# Patient Record
Sex: MALE | Race: WHITE | Employment: OTHER | URBAN - NONMETROPOLITAN AREA
[De-identification: names, ages, dates, MRNs, and addresses within clinical notes are randomized per-mention and may not be internally consistent; named-entity substitution may affect disease eponyms.]

---

## 2018-09-21 ENCOUNTER — HOSPITAL ENCOUNTER (INPATIENT)
Age: 28
LOS: 1 days | Discharge: HOME | DRG: 710 | End: 2018-09-23
Attending: EMERGENCY MEDICINE | Admitting: ORTHOPAEDIC SURGERY
Payer: MEDICAID

## 2018-09-21 ENCOUNTER — APPOINTMENT (OUTPATIENT)
Dept: GENERAL RADIOLOGY | Age: 28
DRG: 710 | End: 2018-09-21
Payer: MEDICAID

## 2018-09-21 DIAGNOSIS — M00.9 PYOGENIC ARTHRITIS OF RIGHT KNEE JOINT, DUE TO UNSPECIFIED ORGANISM (HCC): Primary | ICD-10-CM

## 2018-09-21 LAB
ANION GAP SERPL CALCULATED.3IONS-SCNC: 15 MEQ/L (ref 8–16)
BASOPHILS # BLD: 0.2 %
BASOPHILS ABSOLUTE: 0 THOU/MM3 (ref 0–0.1)
BUN BLDV-MCNC: 7 MG/DL (ref 7–22)
C-REACTIVE PROTEIN: 11.39 MG/DL (ref 0–1)
CALCIUM SERPL-MCNC: 10 MG/DL (ref 8.5–10.5)
CHLORIDE BLD-SCNC: 98 MEQ/L (ref 98–111)
CO2: 25 MEQ/L (ref 23–33)
CREAT SERPL-MCNC: 1.2 MG/DL (ref 0.4–1.2)
EOSINOPHIL # BLD: 0.2 %
EOSINOPHILS ABSOLUTE: 0 THOU/MM3 (ref 0–0.4)
ERYTHROCYTE [DISTWIDTH] IN BLOOD BY AUTOMATED COUNT: 13 % (ref 11.5–14.5)
ERYTHROCYTE [DISTWIDTH] IN BLOOD BY AUTOMATED COUNT: 42 FL (ref 35–45)
GFR SERPL CREATININE-BSD FRML MDRD: 73 ML/MIN/1.73M2
GLUCOSE BLD-MCNC: 232 MG/DL (ref 70–108)
HCT VFR BLD CALC: 44.6 % (ref 42–52)
HEMOGLOBIN: 15.6 GM/DL (ref 14–18)
IMMATURE GRANS (ABS): 0.06 THOU/MM3 (ref 0–0.07)
IMMATURE GRANULOCYTES: 0.4 %
LYMPHOCYTES # BLD: 9.4 %
LYMPHOCYTES ABSOLUTE: 1.4 THOU/MM3 (ref 1–4.8)
MCH RBC QN AUTO: 31.1 PG (ref 26–33)
MCHC RBC AUTO-ENTMCNC: 35 GM/DL (ref 32.2–35.5)
MCV RBC AUTO: 88.8 FL (ref 80–94)
MONOCYTES # BLD: 6.3 %
MONOCYTES ABSOLUTE: 1 THOU/MM3 (ref 0.4–1.3)
NUCLEATED RED BLOOD CELLS: 0 /100 WBC
OSMOLALITY CALCULATION: 281.1 MOSMOL/KG (ref 275–300)
PLATELET # BLD: 266 THOU/MM3 (ref 130–400)
PMV BLD AUTO: 10.2 FL (ref 9.4–12.4)
POTASSIUM SERPL-SCNC: 3.7 MEQ/L (ref 3.5–5.2)
RBC # BLD: 5.02 MILL/MM3 (ref 4.7–6.1)
SEG NEUTROPHILS: 83.5 %
SEGMENTED NEUTROPHILS ABSOLUTE COUNT: 12.9 THOU/MM3 (ref 1.8–7.7)
SODIUM BLD-SCNC: 138 MEQ/L (ref 135–145)
WBC # BLD: 15.4 THOU/MM3 (ref 4.8–10.8)

## 2018-09-21 PROCEDURE — 86140 C-REACTIVE PROTEIN: CPT

## 2018-09-21 PROCEDURE — 84550 ASSAY OF BLOOD/URIC ACID: CPT

## 2018-09-21 PROCEDURE — 20610 DRAIN/INJ JOINT/BURSA W/O US: CPT

## 2018-09-21 PROCEDURE — 80048 BASIC METABOLIC PNL TOTAL CA: CPT

## 2018-09-21 PROCEDURE — 85025 COMPLETE CBC W/AUTO DIFF WBC: CPT

## 2018-09-21 PROCEDURE — 36415 COLL VENOUS BLD VENIPUNCTURE: CPT

## 2018-09-21 PROCEDURE — 6370000000 HC RX 637 (ALT 250 FOR IP): Performed by: STUDENT IN AN ORGANIZED HEALTH CARE EDUCATION/TRAINING PROGRAM

## 2018-09-21 PROCEDURE — 99285 EMERGENCY DEPT VISIT HI MDM: CPT

## 2018-09-21 PROCEDURE — 73564 X-RAY EXAM KNEE 4 OR MORE: CPT

## 2018-09-21 RX ORDER — KETOROLAC TROMETHAMINE 30 MG/ML
30 INJECTION, SOLUTION INTRAMUSCULAR; INTRAVENOUS ONCE
Status: DISCONTINUED | OUTPATIENT
Start: 2018-09-21 | End: 2018-09-21

## 2018-09-21 RX ORDER — TRAMADOL HYDROCHLORIDE 50 MG/1
50 TABLET ORAL ONCE
Status: COMPLETED | OUTPATIENT
Start: 2018-09-21 | End: 2018-09-21

## 2018-09-21 RX ADMIN — TRAMADOL HYDROCHLORIDE 50 MG: 50 TABLET, FILM COATED ORAL at 23:36

## 2018-09-21 ASSESSMENT — PAIN DESCRIPTION - LOCATION: LOCATION: KNEE

## 2018-09-21 ASSESSMENT — PAIN DESCRIPTION - PAIN TYPE: TYPE: ACUTE PAIN

## 2018-09-21 ASSESSMENT — ENCOUNTER SYMPTOMS
BACK PAIN: 0
COLOR CHANGE: 1
VOMITING: 0
NAUSEA: 0
SHORTNESS OF BREATH: 0
COUGH: 0

## 2018-09-21 ASSESSMENT — PAIN SCALES - GENERAL
PAINLEVEL_OUTOF10: 10
PAINLEVEL_OUTOF10: 10

## 2018-09-21 ASSESSMENT — PAIN DESCRIPTION - ORIENTATION: ORIENTATION: RIGHT

## 2018-09-22 ENCOUNTER — ANESTHESIA (OUTPATIENT)
Dept: OPERATING ROOM | Age: 28
DRG: 710 | End: 2018-09-22
Payer: MEDICAID

## 2018-09-22 ENCOUNTER — ANESTHESIA EVENT (OUTPATIENT)
Dept: OPERATING ROOM | Age: 28
DRG: 710 | End: 2018-09-22
Payer: MEDICAID

## 2018-09-22 ENCOUNTER — APPOINTMENT (OUTPATIENT)
Dept: GENERAL RADIOLOGY | Age: 28
DRG: 710 | End: 2018-09-22
Payer: MEDICAID

## 2018-09-22 VITALS
DIASTOLIC BLOOD PRESSURE: 49 MMHG | RESPIRATION RATE: 6 BRPM | SYSTOLIC BLOOD PRESSURE: 67 MMHG | TEMPERATURE: 98.6 F | OXYGEN SATURATION: 100 %

## 2018-09-22 PROBLEM — R73.9 HYPERGLYCEMIA: Status: ACTIVE | Noted: 2018-09-22

## 2018-09-22 PROBLEM — M00.9 SEPTIC ARTHRITIS OF KNEE (HCC): Status: ACTIVE | Noted: 2018-09-22

## 2018-09-22 PROBLEM — E79.0 HYPERURICEMIA: Status: ACTIVE | Noted: 2018-09-22

## 2018-09-22 PROBLEM — N39.0 UTI (URINARY TRACT INFECTION): Status: ACTIVE | Noted: 2018-09-22

## 2018-09-22 LAB
AMPHETAMINE+METHAMPHETAMINE URINE SCREEN: NEGATIVE
APTT: 37 SECONDS (ref 22–38)
AVERAGE GLUCOSE: 120 MG/DL (ref 70–126)
BACTERIA: ABNORMAL /HPF
BARBITURATE QUANTITATIVE URINE: NEGATIVE
BENZODIAZEPINE QUANTITATIVE URINE: NEGATIVE
BILIRUBIN URINE: NEGATIVE
BLOOD, URINE: ABNORMAL
CANNABINOID QUANTITATIVE URINE: NEGATIVE
CASTS 2: ABNORMAL /LPF
CASTS UA: ABNORMAL /LPF
CHARACTER, URINE: CLEAR
CHARACTER,SYN.FL: ABNORMAL
CHARACTER,SYN.FL: ABNORMAL
COCAINE METABOLITE QUANTITATIVE URINE: NEGATIVE
COLOR FLUID: ABNORMAL
COLOR FLUID: ABNORMAL
COLOR: YELLOW
CRYSTALS, FLUID: ABNORMAL
CRYSTALS, FLUID: ABNORMAL
CRYSTALS, UA: ABNORMAL
EPITHELIAL CELLS, UA: ABNORMAL /HPF
GLUCOSE BLD-MCNC: 110 MG/DL (ref 70–108)
GLUCOSE BLD-MCNC: 126 MG/DL (ref 70–108)
GLUCOSE BLD-MCNC: 145 MG/DL (ref 70–108)
GLUCOSE BLD-MCNC: 162 MG/DL (ref 70–108)
GLUCOSE URINE: 250 MG/DL
HBA1C MFR BLD: 6 % (ref 4.4–6.4)
INR BLD: 1.12 (ref 0.85–1.13)
KETONES, URINE: NEGATIVE
LACTIC ACID: 1.8 MMOL/L (ref 0.5–2.2)
LEUKOCYTE ESTERASE, URINE: NEGATIVE
MISCELLANEOUS 2: ABNORMAL
MONONUCLEAR CELLS SYNOVIAL FLUID: 5.3 % (ref 0–74)
MONONUCLEAR CELLS SYNOVIAL FLUID: 7.6 % (ref 0–74)
NITRITE, URINE: POSITIVE
OPIATES, URINE: NEGATIVE
OXYCODONE: NEGATIVE
PATHOLOGIST REVIEW: ABNORMAL
PATHOLOGIST REVIEW: ABNORMAL
PH UA: 6.5
PHENCYCLIDINE QUANTITATIVE URINE: NEGATIVE
POLYMORPHONUCLEAR CELLS SYNOVIAL FLUID: 92.4 % (ref 0–24)
POLYMORPHONUCLEAR CELLS SYNOVIAL FLUID: 94.7 % (ref 0–24)
PROCALCITONIN: 0.14 NG/ML (ref 0.01–0.09)
PROTEIN UA: 30
RBC URINE: ABNORMAL /HPF
RENAL EPITHELIAL, UA: ABNORMAL
SEDIMENTATION RATE, ERYTHROCYTE: 53 MM/HR (ref 0–10)
SPECIFIC GRAVITY, URINE: 1.01 (ref 1–1.03)
TOTAL NUCLEATED CELLS SYNOVIAL: ABNORMAL /CUMM (ref 0–150)
TOTAL NUCLEATED CELLS SYNOVIAL: ABNORMAL /CUMM (ref 0–150)
TOTAL VOLUME RECEIVED SYNOVIAL: 25 ML
TOTAL VOLUME RECEIVED SYNOVIAL: 7 ML
TROPONIN T: < 0.01 NG/ML
URIC ACID: 11.1 MG/DL (ref 3.7–7)
UROBILINOGEN, URINE: 1 EU/DL
WBC UA: ABNORMAL /HPF
YEAST: ABNORMAL

## 2018-09-22 PROCEDURE — 2709999900 HC NON-CHARGEABLE SUPPLY

## 2018-09-22 PROCEDURE — 2709999900 HC NON-CHARGEABLE SUPPLY: Performed by: ORTHOPAEDIC SURGERY

## 2018-09-22 PROCEDURE — 85610 PROTHROMBIN TIME: CPT

## 2018-09-22 PROCEDURE — 3700000001 HC ADD 15 MINUTES (ANESTHESIA): Performed by: ORTHOPAEDIC SURGERY

## 2018-09-22 PROCEDURE — 36430 TRANSFUSION BLD/BLD COMPNT: CPT

## 2018-09-22 PROCEDURE — 89050 BODY FLUID CELL COUNT: CPT

## 2018-09-22 PROCEDURE — 2580000003 HC RX 258: Performed by: EMERGENCY MEDICINE

## 2018-09-22 PROCEDURE — 7100000000 HC PACU RECOVERY - FIRST 15 MIN: Performed by: ORTHOPAEDIC SURGERY

## 2018-09-22 PROCEDURE — 36415 COLL VENOUS BLD VENIPUNCTURE: CPT

## 2018-09-22 PROCEDURE — 3600000014 HC SURGERY LEVEL 4 ADDTL 15MIN: Performed by: ORTHOPAEDIC SURGERY

## 2018-09-22 PROCEDURE — 87070 CULTURE OTHR SPECIMN AEROBIC: CPT

## 2018-09-22 PROCEDURE — 83036 HEMOGLOBIN GLYCOSYLATED A1C: CPT

## 2018-09-22 PROCEDURE — 2580000003 HC RX 258: Performed by: ORTHOPAEDIC SURGERY

## 2018-09-22 PROCEDURE — 96365 THER/PROPH/DIAG IV INF INIT: CPT

## 2018-09-22 PROCEDURE — 87075 CULTR BACTERIA EXCEPT BLOOD: CPT

## 2018-09-22 PROCEDURE — 87086 URINE CULTURE/COLONY COUNT: CPT

## 2018-09-22 PROCEDURE — 85730 THROMBOPLASTIN TIME PARTIAL: CPT

## 2018-09-22 PROCEDURE — 82948 REAGENT STRIP/BLOOD GLUCOSE: CPT

## 2018-09-22 PROCEDURE — 6360000002 HC RX W HCPCS: Performed by: INTERNAL MEDICINE

## 2018-09-22 PROCEDURE — 87040 BLOOD CULTURE FOR BACTERIA: CPT

## 2018-09-22 PROCEDURE — 6360000002 HC RX W HCPCS: Performed by: EMERGENCY MEDICINE

## 2018-09-22 PROCEDURE — 84484 ASSAY OF TROPONIN QUANT: CPT

## 2018-09-22 PROCEDURE — 94760 N-INVAS EAR/PLS OXIMETRY 1: CPT

## 2018-09-22 PROCEDURE — 1200000000 HC SEMI PRIVATE

## 2018-09-22 PROCEDURE — 3600000004 HC SURGERY LEVEL 4 BASE: Performed by: ORTHOPAEDIC SURGERY

## 2018-09-22 PROCEDURE — 0SBC4ZZ EXCISION OF RIGHT KNEE JOINT, PERCUTANEOUS ENDOSCOPIC APPROACH: ICD-10-PCS | Performed by: ORTHOPAEDIC SURGERY

## 2018-09-22 PROCEDURE — 6370000000 HC RX 637 (ALT 250 FOR IP): Performed by: EMERGENCY MEDICINE

## 2018-09-22 PROCEDURE — 3700000000 HC ANESTHESIA ATTENDED CARE: Performed by: ORTHOPAEDIC SURGERY

## 2018-09-22 PROCEDURE — 2500000003 HC RX 250 WO HCPCS: Performed by: ORTHOPAEDIC SURGERY

## 2018-09-22 PROCEDURE — 83605 ASSAY OF LACTIC ACID: CPT

## 2018-09-22 PROCEDURE — 7100000001 HC PACU RECOVERY - ADDTL 15 MIN: Performed by: ORTHOPAEDIC SURGERY

## 2018-09-22 PROCEDURE — 89060 EXAM SYNOVIAL FLUID CRYSTALS: CPT

## 2018-09-22 PROCEDURE — 87186 SC STD MICRODIL/AGAR DIL: CPT

## 2018-09-22 PROCEDURE — 81001 URINALYSIS AUTO W/SCOPE: CPT

## 2018-09-22 PROCEDURE — 2580000003 HC RX 258: Performed by: ANESTHESIOLOGY

## 2018-09-22 PROCEDURE — 87205 SMEAR GRAM STAIN: CPT

## 2018-09-22 PROCEDURE — 99252 IP/OBS CONSLTJ NEW/EST SF 35: CPT | Performed by: FAMILY MEDICINE

## 2018-09-22 PROCEDURE — 6370000000 HC RX 637 (ALT 250 FOR IP): Performed by: FAMILY MEDICINE

## 2018-09-22 PROCEDURE — 6360000002 HC RX W HCPCS: Performed by: ANESTHESIOLOGY

## 2018-09-22 PROCEDURE — 71045 X-RAY EXAM CHEST 1 VIEW: CPT

## 2018-09-22 PROCEDURE — 84145 PROCALCITONIN (PCT): CPT

## 2018-09-22 PROCEDURE — 85651 RBC SED RATE NONAUTOMATED: CPT

## 2018-09-22 PROCEDURE — 80307 DRUG TEST PRSMV CHEM ANLYZR: CPT

## 2018-09-22 PROCEDURE — 87077 CULTURE AEROBIC IDENTIFY: CPT

## 2018-09-22 RX ORDER — MORPHINE SULFATE 2 MG/ML
2 INJECTION, SOLUTION INTRAMUSCULAR; INTRAVENOUS
Status: CANCELLED | OUTPATIENT
Start: 2018-09-22

## 2018-09-22 RX ORDER — ACETAMINOPHEN 325 MG/1
650 TABLET ORAL EVERY 4 HOURS PRN
Status: CANCELLED | OUTPATIENT
Start: 2018-09-22

## 2018-09-22 RX ORDER — HYDROCODONE BITARTRATE AND ACETAMINOPHEN 5; 325 MG/1; MG/1
1 TABLET ORAL EVERY 4 HOURS PRN
Status: CANCELLED | OUTPATIENT
Start: 2018-09-22

## 2018-09-22 RX ORDER — BUPIVACAINE HYDROCHLORIDE AND EPINEPHRINE 5; 5 MG/ML; UG/ML
INJECTION, SOLUTION EPIDURAL; INTRACAUDAL; PERINEURAL PRN
Status: DISCONTINUED | OUTPATIENT
Start: 2018-09-22 | End: 2018-09-23 | Stop reason: HOSPADM

## 2018-09-22 RX ORDER — SODIUM CHLORIDE 0.9 % (FLUSH) 0.9 %
10 SYRINGE (ML) INJECTION EVERY 12 HOURS SCHEDULED
Status: CANCELLED | OUTPATIENT
Start: 2018-09-22

## 2018-09-22 RX ORDER — FENTANYL CITRATE 50 UG/ML
50 INJECTION, SOLUTION INTRAMUSCULAR; INTRAVENOUS EVERY 5 MIN PRN
Status: DISCONTINUED | OUTPATIENT
Start: 2018-09-22 | End: 2018-09-22 | Stop reason: HOSPADM

## 2018-09-22 RX ORDER — DOCUSATE SODIUM 100 MG/1
100 CAPSULE, LIQUID FILLED ORAL 2 TIMES DAILY
Status: CANCELLED | OUTPATIENT
Start: 2018-09-22

## 2018-09-22 RX ORDER — MORPHINE SULFATE 4 MG/ML
4 INJECTION, SOLUTION INTRAMUSCULAR; INTRAVENOUS
Status: CANCELLED | OUTPATIENT
Start: 2018-09-22

## 2018-09-22 RX ORDER — NICOTINE POLACRILEX 4 MG
15 LOZENGE BUCCAL PRN
Status: DISCONTINUED | OUTPATIENT
Start: 2018-09-22 | End: 2018-09-23 | Stop reason: HOSPADM

## 2018-09-22 RX ORDER — SODIUM CHLORIDE 0.9 % (FLUSH) 0.9 %
10 SYRINGE (ML) INJECTION PRN
Status: CANCELLED | OUTPATIENT
Start: 2018-09-22

## 2018-09-22 RX ORDER — ONDANSETRON 2 MG/ML
4 INJECTION INTRAMUSCULAR; INTRAVENOUS EVERY 6 HOURS PRN
Status: CANCELLED | OUTPATIENT
Start: 2018-09-22

## 2018-09-22 RX ORDER — SODIUM CHLORIDE 9 MG/ML
INJECTION, SOLUTION INTRAVENOUS CONTINUOUS
Status: CANCELLED | OUTPATIENT
Start: 2018-09-22 | End: 2018-09-22

## 2018-09-22 RX ORDER — KETOROLAC TROMETHAMINE 30 MG/ML
INJECTION, SOLUTION INTRAMUSCULAR; INTRAVENOUS PRN
Status: DISCONTINUED | OUTPATIENT
Start: 2018-09-22 | End: 2018-09-22 | Stop reason: SDUPTHER

## 2018-09-22 RX ORDER — FENTANYL CITRATE 50 UG/ML
INJECTION, SOLUTION INTRAMUSCULAR; INTRAVENOUS PRN
Status: DISCONTINUED | OUTPATIENT
Start: 2018-09-22 | End: 2018-09-22 | Stop reason: SDUPTHER

## 2018-09-22 RX ORDER — ACETAMINOPHEN 325 MG/1
650 TABLET ORAL EVERY 6 HOURS PRN
COMMUNITY

## 2018-09-22 RX ORDER — MIDAZOLAM HYDROCHLORIDE 1 MG/ML
INJECTION INTRAMUSCULAR; INTRAVENOUS PRN
Status: DISCONTINUED | OUTPATIENT
Start: 2018-09-22 | End: 2018-09-22 | Stop reason: SDUPTHER

## 2018-09-22 RX ORDER — SODIUM CHLORIDE 9 MG/ML
INJECTION, SOLUTION INTRAVENOUS CONTINUOUS
Status: DISCONTINUED | OUTPATIENT
Start: 2018-09-22 | End: 2018-09-22

## 2018-09-22 RX ORDER — 0.9 % SODIUM CHLORIDE 0.9 %
1000 INTRAVENOUS SOLUTION INTRAVENOUS ONCE
Status: COMPLETED | OUTPATIENT
Start: 2018-09-22 | End: 2018-09-22

## 2018-09-22 RX ORDER — 0.9 % SODIUM CHLORIDE 0.9 %
30 INTRAVENOUS SOLUTION INTRAVENOUS ONCE
Status: COMPLETED | OUTPATIENT
Start: 2018-09-22 | End: 2018-09-22

## 2018-09-22 RX ORDER — ACETAMINOPHEN 325 MG/1
650 TABLET ORAL ONCE
Status: COMPLETED | OUTPATIENT
Start: 2018-09-22 | End: 2018-09-22

## 2018-09-22 RX ORDER — HYDROCODONE BITARTRATE AND ACETAMINOPHEN 5; 325 MG/1; MG/1
2 TABLET ORAL EVERY 4 HOURS PRN
Status: CANCELLED | OUTPATIENT
Start: 2018-09-22

## 2018-09-22 RX ORDER — PROPOFOL 10 MG/ML
INJECTION, EMULSION INTRAVENOUS PRN
Status: DISCONTINUED | OUTPATIENT
Start: 2018-09-22 | End: 2018-09-22 | Stop reason: SDUPTHER

## 2018-09-22 RX ORDER — DEXTROSE MONOHYDRATE 50 MG/ML
100 INJECTION, SOLUTION INTRAVENOUS PRN
Status: DISCONTINUED | OUTPATIENT
Start: 2018-09-22 | End: 2018-09-23 | Stop reason: HOSPADM

## 2018-09-22 RX ORDER — ACETAMINOPHEN 325 MG/1
650 TABLET ORAL EVERY 4 HOURS PRN
Status: DISCONTINUED | OUTPATIENT
Start: 2018-09-22 | End: 2018-09-23 | Stop reason: HOSPADM

## 2018-09-22 RX ORDER — SODIUM CHLORIDE 9 MG/ML
INJECTION, SOLUTION INTRAVENOUS CONTINUOUS PRN
Status: DISCONTINUED | OUTPATIENT
Start: 2018-09-22 | End: 2018-09-22 | Stop reason: SDUPTHER

## 2018-09-22 RX ORDER — DEXTROSE MONOHYDRATE 25 G/50ML
12.5 INJECTION, SOLUTION INTRAVENOUS PRN
Status: DISCONTINUED | OUTPATIENT
Start: 2018-09-22 | End: 2018-09-23 | Stop reason: HOSPADM

## 2018-09-22 RX ORDER — SUCCINYLCHOLINE CHLORIDE 20 MG/ML
INJECTION INTRAMUSCULAR; INTRAVENOUS PRN
Status: DISCONTINUED | OUTPATIENT
Start: 2018-09-22 | End: 2018-09-22 | Stop reason: SDUPTHER

## 2018-09-22 RX ORDER — METHYLPREDNISOLONE 4 MG/1
4 TABLET ORAL DAILY
Status: DISCONTINUED | OUTPATIENT
Start: 2018-09-22 | End: 2018-09-23 | Stop reason: HOSPADM

## 2018-09-22 RX ORDER — METHYLPREDNISOLONE 4 MG/1
TABLET ORAL
Qty: 1 KIT | Refills: 0 | Status: SHIPPED | OUTPATIENT
Start: 2018-09-22 | End: 2018-09-28

## 2018-09-22 RX ORDER — MORPHINE SULFATE 2 MG/ML
2 INJECTION, SOLUTION INTRAMUSCULAR; INTRAVENOUS EVERY 5 MIN PRN
Status: DISCONTINUED | OUTPATIENT
Start: 2018-09-22 | End: 2018-09-22 | Stop reason: HOSPADM

## 2018-09-22 RX ORDER — LABETALOL HYDROCHLORIDE 5 MG/ML
10 INJECTION, SOLUTION INTRAVENOUS EVERY 10 MIN PRN
Status: DISCONTINUED | OUTPATIENT
Start: 2018-09-22 | End: 2018-09-22 | Stop reason: HOSPADM

## 2018-09-22 RX ADMIN — VANCOMYCIN HYDROCHLORIDE 1000 MG: 1 INJECTION, POWDER, LYOPHILIZED, FOR SOLUTION INTRAVENOUS at 07:06

## 2018-09-22 RX ADMIN — MIDAZOLAM HYDROCHLORIDE 2 MG: 1 INJECTION, SOLUTION INTRAMUSCULAR; INTRAVENOUS at 06:33

## 2018-09-22 RX ADMIN — PROPOFOL 40 MG: 10 INJECTION, EMULSION INTRAVENOUS at 06:38

## 2018-09-22 RX ADMIN — ACETAMINOPHEN 650 MG: 325 TABLET ORAL at 01:41

## 2018-09-22 RX ADMIN — SODIUM CHLORIDE: 9 INJECTION, SOLUTION INTRAVENOUS at 06:17

## 2018-09-22 RX ADMIN — INSULIN LISPRO 1 UNITS: 100 INJECTION, SOLUTION INTRAVENOUS; SUBCUTANEOUS at 21:28

## 2018-09-22 RX ADMIN — PROPOFOL 100 MG: 10 INJECTION, EMULSION INTRAVENOUS at 07:00

## 2018-09-22 RX ADMIN — METHYLPREDNISOLONE 4 MG: 4 TABLET ORAL at 13:13

## 2018-09-22 RX ADMIN — SODIUM CHLORIDE: 9 INJECTION, SOLUTION INTRAVENOUS at 04:27

## 2018-09-22 RX ADMIN — FENTANYL CITRATE 100 MCG: 50 INJECTION INTRAMUSCULAR; INTRAVENOUS at 06:33

## 2018-09-22 RX ADMIN — VANCOMYCIN HYDROCHLORIDE 1000 MG: 1 INJECTION, POWDER, LYOPHILIZED, FOR SOLUTION INTRAVENOUS at 03:16

## 2018-09-22 RX ADMIN — CEFTRIAXONE SODIUM 1 G: 1 INJECTION, POWDER, FOR SOLUTION INTRAMUSCULAR; INTRAVENOUS at 02:51

## 2018-09-22 RX ADMIN — PROPOFOL 100 MG: 10 INJECTION, EMULSION INTRAVENOUS at 06:55

## 2018-09-22 RX ADMIN — PROPOFOL 160 MG: 10 INJECTION, EMULSION INTRAVENOUS at 06:33

## 2018-09-22 RX ADMIN — SODIUM CHLORIDE 1000 ML: 9 INJECTION, SOLUTION INTRAVENOUS at 03:19

## 2018-09-22 RX ADMIN — SUCCINYLCHOLINE CHLORIDE 120 MG: 20 INJECTION, SOLUTION INTRAMUSCULAR; INTRAVENOUS at 06:34

## 2018-09-22 RX ADMIN — KETOROLAC TROMETHAMINE 30 MG: 30 INJECTION, SOLUTION INTRAMUSCULAR; INTRAVENOUS at 07:09

## 2018-09-22 RX ADMIN — SODIUM CHLORIDE 2421 ML: 9 INJECTION, SOLUTION INTRAVENOUS at 00:50

## 2018-09-22 ASSESSMENT — PULMONARY FUNCTION TESTS
PIF_VALUE: 21
PIF_VALUE: 5
PIF_VALUE: 21
PIF_VALUE: 24
PIF_VALUE: 20
PIF_VALUE: 23
PIF_VALUE: 7
PIF_VALUE: 23
PIF_VALUE: 5
PIF_VALUE: 25
PIF_VALUE: 24
PIF_VALUE: 27
PIF_VALUE: 10
PIF_VALUE: 1
PIF_VALUE: 9
PIF_VALUE: 21
PIF_VALUE: 24
PIF_VALUE: 5
PIF_VALUE: 4
PIF_VALUE: 23
PIF_VALUE: 7
PIF_VALUE: 4
PIF_VALUE: 21
PIF_VALUE: 9
PIF_VALUE: 24
PIF_VALUE: 15
PIF_VALUE: 2
PIF_VALUE: 3
PIF_VALUE: 23
PIF_VALUE: 19
PIF_VALUE: 1
PIF_VALUE: 20
PIF_VALUE: 20
PIF_VALUE: 23
PIF_VALUE: 4
PIF_VALUE: 22
PIF_VALUE: 1
PIF_VALUE: 22
PIF_VALUE: 6
PIF_VALUE: 21
PIF_VALUE: 4
PIF_VALUE: 23
PIF_VALUE: 24
PIF_VALUE: 21
PIF_VALUE: 6
PIF_VALUE: 21
PIF_VALUE: 24
PIF_VALUE: 21
PIF_VALUE: 1
PIF_VALUE: 1
PIF_VALUE: 4
PIF_VALUE: 7
PIF_VALUE: 22
PIF_VALUE: 24
PIF_VALUE: 23
PIF_VALUE: 24
PIF_VALUE: 25
PIF_VALUE: 20
PIF_VALUE: 21
PIF_VALUE: 4

## 2018-09-22 ASSESSMENT — PAIN SCALES - GENERAL
PAINLEVEL_OUTOF10: 6
PAINLEVEL_OUTOF10: 0
PAINLEVEL_OUTOF10: 5
PAINLEVEL_OUTOF10: 6
PAINLEVEL_OUTOF10: 6
PAINLEVEL_OUTOF10: 3
PAINLEVEL_OUTOF10: 6
PAINLEVEL_OUTOF10: 0
PAINLEVEL_OUTOF10: 0

## 2018-09-22 ASSESSMENT — ENCOUNTER SYMPTOMS
RHINORRHEA: 0
BACK PAIN: 0
SHORTNESS OF BREATH: 0
EYE DISCHARGE: 0
SORE THROAT: 0
DIARRHEA: 0
EYE REDNESS: 0
NAUSEA: 0
WHEEZING: 0
COUGH: 0
ABDOMINAL PAIN: 0
VOMITING: 0

## 2018-09-22 ASSESSMENT — PAIN DESCRIPTION - PROGRESSION
CLINICAL_PROGRESSION: NOT CHANGED

## 2018-09-22 NOTE — H&P
Ortho H&P/Consult  Patient:  Colt Stephenson  YOB: 1990  MRN: [id number]     Acct: [de-identified]    PCP: Anna Mccoy MD  Date of Admission: 9/21/2018  Date of Service: Pt seen/examined on 9/22/2018     Chief Complaint: right knee pain since 9/19/18   History Of Present Illness: 33 y.o. male who presents with right knee pain since 9/19/18. No known injury, fall or scratches on knee. Patient had gradually increasing knee pain that progressed to point he couldn't walk so was brought to ED. Was treated with ice and Tylenol at home. Patient lives with his mother and is able to walk as far as he wants without pain. Does have history of gouty arthritis. Patient ambulation status: no difficulty prior to injury. Antiplatelets/Anticoagulation includes: none. Hx from chart and/or Pt's mother    Past Medical History:        Diagnosis Date    Blindness, legal        Past Surgical History:        Procedure Laterality Date    TESTICLE SURGERY Right 1994, 1996, and 2000    Dr. Mihai Naik and Dr. Sophia Damico Medications:   Prior to Admission medications    Medication Sig Start Date End Date Taking?  Authorizing Provider   acetaminophen (TYLENOL) 325 MG tablet Take 650 mg by mouth every 6 hours as needed for Pain   Yes Historical Provider, MD   aspirin 81 MG tablet Take 81 mg by mouth as needed for Pain   Yes Historical Provider, MD       Current Hospital Medications:    Current Facility-Administered Medications:     glucose (GLUTOSE) 40 % oral gel 15 g, 15 g, Oral, PRN, Ana Laura Udeagbala, DO    dextrose 50 % solution 12.5 g, 12.5 g, Intravenous, PRN, Ana Laura Udeagbala, DO    glucagon (rDNA) injection 1 mg, 1 mg, Intramuscular, PRN, Ana Laura Udeagbala, DO    dextrose 5 % solution, 100 mL/hr, Intravenous, PRN, Ana Laura Udeagbala, DO    0.9 % sodium chloride infusion, , Intravenous, Continuous, Vanita Quijano MD, Last Rate: 125 mL/hr at

## 2018-09-22 NOTE — ED NOTES
Pt resting on cot watching television with family at bedside. Respirations easy and unlabored.       Markos Salazar RN  09/22/18 1224

## 2018-09-22 NOTE — ED NOTES
Dr. Issa Keen at bedside for synovial fluid removal at this time. Pt tolerating well. Fluid sent to lab.      Juanpablo Arroyo RN  09/22/18 2547

## 2018-09-22 NOTE — ED NOTES
Pt resting on cot with eyes closed for comfort. Pt no longer flushed at this time, ice packs remain in place.       Alicia Evans RN  09/22/18 4300

## 2018-09-22 NOTE — ANESTHESIA PRE PROCEDURE
Department of Anesthesiology  Preprocedure Note       Name:  Leno Redd   Age:  33 y.o.  :  1990                                          MRN:  [id number]         Date:  2018      Surgeon: Sasha Baires):  Adrian Denny MD    Procedure: Procedure(s):  ARTHROSCOPIC KNEE I&D    Medications prior to admission:   Prior to Admission medications    Medication Sig Start Date End Date Taking? Authorizing Provider   acetaminophen (TYLENOL) 325 MG tablet Take 650 mg by mouth every 6 hours as needed for Pain   Yes Historical Provider, MD   aspirin 81 MG tablet Take 81 mg by mouth as needed for Pain   Yes Historical Provider, MD       Current medications:    Current Facility-Administered Medications   Medication Dose Route Frequency Provider Last Rate Last Dose    glucose (GLUTOSE) 40 % oral gel 15 g  15 g Oral PRN Ana Laura Udeagbala, DO        dextrose 50 % solution 12.5 g  12.5 g Intravenous PRN Ana Laura Udeagbala, DO        glucagon (rDNA) injection 1 mg  1 mg Intramuscular PRN Ana Laura Udeagbala, DO        dextrose 5 % solution  100 mL/hr Intravenous PRN Ana Laura Udeagbala, DO        0.9 % sodium chloride infusion   Intravenous Continuous Kaitlynn Spray,  mL/hr at 18 0427      insulin lispro (HUMALOG) injection vial 0-6 Units  0-6 Units Subcutaneous TID WC Ana Laura Udeagbala, DO        insulin lispro (HUMALOG) injection vial 0-3 Units  0-3 Units Subcutaneous Nightly Ana Laura Udeagbala, DO         Current Outpatient Prescriptions   Medication Sig Dispense Refill    acetaminophen (TYLENOL) 325 MG tablet Take 650 mg by mouth every 6 hours as needed for Pain      aspirin 81 MG tablet Take 81 mg by mouth as needed for Pain         Allergies:     Allergies   Allergen Reactions    Ibuprofen      Seizure        Problem List:    Patient Active Problem List   Diagnosis Code    Legally blind H54.8    Septic arthritis of knee ([facility] [address] M00.9    Hyperglycemia R73.9    UTI (urinary tract infection) N39.0  Hyperuricemia E79.0       Past Medical History:        Diagnosis Date    Blindness, legal        Past Surgical History:        Procedure Laterality Date    TESTICLE SURGERY Right 1994, 1996, and 2000    Dr. Roz De Guzman and Dr. Victoria Valles       Social History:    Social History   Substance Use Topics    Smoking status: Passive Smoke Exposure - Never Smoker    Smokeless tobacco: Never Used    Alcohol use No                                Counseling given: Not Answered      Vital Signs (Current):   Vitals:    09/22/18 0232 09/22/18 0319 09/22/18 0421 09/22/18 0526   BP: 137/79 (!) 155/88 (!) 126/97 (!) 128/94   Pulse: 131 125 128 121   Resp: 16 16 16 16   Temp: 103 °F (39.4 °C)  102.7 °F (39.3 °C) 102.4 °F (39.1 °C)   TempSrc: Oral  Oral Oral   SpO2: 99% 98% 99% 97%   Weight:       Height:                                                  BP Readings from Last 3 Encounters:   09/22/18 (!) 128/94   11/08/16 128/65   11/10/15 126/90       NPO Status:                                                                                 BMI:   Wt Readings from Last 3 Encounters:   09/21/18 178 lb (80.7 kg)   11/08/16 178 lb (80.7 kg)   11/10/15 170 lb 8 oz (77.3 kg)     Body mass index is 31.53 kg/m². CBC:   Lab Results   Component Value Date    WBC 15.4 09/21/2018    RBC 5.02 09/21/2018    HGB 15.6 09/21/2018    HCT 44.6 09/21/2018    MCV 88.8 09/21/2018     09/21/2018       CMP:   Lab Results   Component Value Date     09/21/2018    K 3.7 09/21/2018    CL 98 09/21/2018    CO2 25 09/21/2018    BUN 7 09/21/2018    CREATININE 1.2 09/21/2018    LABGLOM 73 09/21/2018    GLUCOSE 232 09/21/2018    CALCIUM 10.0 09/21/2018       POC Tests: No results for input(s): POCGLU, POCNA, POCK, POCCL, POCBUN, POCHEMO, POCHCT in the last 72 hours.     Coags:   Lab Results   Component Value Date    INR 1.12 09/22/2018    APTT 37.0 09/22/2018       HCG (If Applicable): No results found for:

## 2018-09-22 NOTE — ED PROVIDER NOTES
[facility]  eMERGENCY dEPARTMENT eNCOUnter          CHIEF COMPLAINT       Chief Complaint   Patient presents with    Knee Pain       Nurses Notes reviewed and I agree except as noted in the HPI. HISTORY OF PRESENT ILLNESS    Parvez Das is a 33 y.o. male who presents to Emergency Department with right knee pain since Wednesday night. Patient does not remember any specific injury to his knee. His knee pain is at a 6/10 in severity at rest, but increases to 10/10 when standing. He denies calf pain or right thigh pain. He denies experiencing chest pain, neck pain, sore throat or urinary problems. Patient states he has never had any STDs or other medical problems. He took Tylenol earlier today. Patient denies any further complaints at initial encounter. REVIEW OF SYSTEMS     Review of Systems   Constitutional: Negative for appetite change, chills, fatigue and fever. HENT: Negative for congestion, ear pain, rhinorrhea and sore throat. Eyes: Negative for discharge, redness and visual disturbance. Respiratory: Negative for cough, shortness of breath and wheezing. Cardiovascular: Negative for chest pain, palpitations and leg swelling. Gastrointestinal: Negative for abdominal pain, diarrhea, nausea and vomiting. Genitourinary: Negative for decreased urine volume, difficulty urinating, dysuria and hematuria. Musculoskeletal: Positive for arthralgias (right knee ). Negative for back pain, joint swelling and neck pain. Skin: Negative for pallor and rash. Allergic/Immunologic: Negative for environmental allergies. Neurological: Negative for dizziness, syncope, weakness, light-headedness, numbness and headaches. Hematological: Negative for adenopathy. Psychiatric/Behavioral: Negative for confusion and suicidal ideas. The patient is not nervous/anxious.            PAST MEDICAL HISTORY    has a past medical history of Blindness, legal.    SURGICAL HISTORY      has a past normal.   Neck: Normal range of motion. Neck supple. No JVD present. Cardiovascular: Regular rhythm, normal heart sounds, intact distal pulses and normal pulses. Tachycardia present. Exam reveals no gallop and no friction rub. No murmur heard. Pulses:       Dorsalis pedis pulses are 2+ on the right side, and 2+ on the left side. Pulmonary/Chest: Effort normal and breath sounds normal. No respiratory distress. He has no decreased breath sounds. He has no wheezes. He has no rhonchi. He has no rales. Abdominal: Soft. Bowel sounds are normal. He exhibits no distension. There is no tenderness. There is no rebound, no guarding and no CVA tenderness. Musculoskeletal: He exhibits no edema. Right hip: Normal.        Right knee: He exhibits decreased range of motion (secondary to pain) and swelling (suprapatellar and lateral aspects, worse on medial aspect). He exhibits no ecchymosis. Tenderness found. Medial joint line and lateral joint line tenderness noted. Right lower leg: He exhibits swelling. He exhibits no bony tenderness, no edema, no deformity and no laceration. Neurological: He is alert and oriented to person, place, and time. He has normal reflexes. No cranial nerve deficit or sensory deficit. He exhibits normal muscle tone. Coordination normal.   Skin: Skin is warm and dry. No rash noted. He is not diaphoretic. Nursing note and vitals reviewed.         DIFFERENTIAL DIAGNOSIS:   Including, but not limited to Gout arthritis, rheumatoid arthritis, septic joint    DIAGNOSTIC RESULTS     EKG: All EKG's are interpreted by the Emergency Department Physician who either signs or Co-signs this chart in the absence of a cardiologist.  None    RADIOLOGY: non-plain film images(s) such as CT, Ultrasound and MRI are read by the radiologist.  Xr Knee Right (min 4 Views)    Result Date: 9/22/2018  PROCEDURE: XR KNEE RIGHT (MIN 4 VIEWS) CLINICAL INFORMATION: right knee hot, red, painful and no known

## 2018-09-22 NOTE — ED NOTES
Dr. Rafaela Ngo at bedside for ultrasound to rule out femoral and popliteal blood clots. Pt tolerating well.       Nimo Yanez RN  09/22/18 0025

## 2018-09-22 NOTE — ED PROVIDER NOTES
indicated that his maternal grandfather is . He indicated that his paternal grandmother is . He indicated that the status of his paternal grandfather is unknown.    family history includes Arthritis in his maternal grandmother; COPD in his maternal grandmother; Cancer in his maternal grandfather and paternal grandmother; Heart Disease in his maternal grandmother; High Blood Pressure in his maternal grandmother and mother. SOCIAL HISTORY      reports that he is a non-smoker but has been exposed to tobacco smoke. He has never used smokeless tobacco. He reports that he does not drink alcohol or use drugs. PHYSICAL EXAM     INITIAL VITALS:  height is 5' 3\" (1.6 m) and weight is 178 lb (80.7 kg). His temperature is 99 °F (37.2 °C). His blood pressure is 141/84 (abnormal) and his pulse is 84. His respiration is 18 and oxygen saturation is 98%. Physical Exam   Constitutional: He is oriented to person, place, and time. He appears well-developed and well-nourished. No distress. HENT:   Head: Normocephalic and atraumatic. Eyes: Pupils are equal, round, and reactive to light. Conjunctivae and EOM are normal.   Neck: Normal range of motion. Neck supple. Cardiovascular: Normal rate and regular rhythm. Pulmonary/Chest: Effort normal and breath sounds normal. No respiratory distress. Musculoskeletal:   Right knee has full flexion, decreased extension. Full passive flexion and extension. Warmth and erythema to right knee. Neurological: He is alert and oriented to person, place, and time. Skin: Skin is warm. No rash noted. He is not diaphoretic. There is erythema (right knee). Nursing note and vitals reviewed.       DIFFERENTIAL DIAGNOSIS:   Arthritis, septic joint, gout  DIAGNOSTIC RESULTS     EKG: All EKG's are interpreted by the Emergency Department Physician who either signs or Co-signs this chart in the absence of a cardiologist.    None    RADIOLOGY: non-plain film images(s) such as CT, Ultrasound and MRI are read by the radiologist.    Pending upon handoff    LABS:   Labs Reviewed   URIC ACID   CBC WITH AUTO DIFFERENTIAL   C-REACTIVE PROTEIN   BASIC METABOLIC PANEL       EMERGENCY DEPARTMENT COURSE:   Vitals:    Vitals:    09/21/18 2252   BP: (!) 141/84   Pulse: 84   Resp: 18   Temp: 99 °F (37.2 °C)   SpO2: 98%   Weight: 178 lb (80.7 kg)   Height: 5' 3\" (1.6 m)     Patient was seen history physical exam was performed. See disposition below    MDM:  Patient seen in a timely manner. Labs and imaging ordered. Pending resulting labs and imaging, patient given tramadol for pain relief and handed off to next provider, Estephanie Munroe CNP. Please see note of Estephanie Munroe. CRITICAL CARE:   None    CONSULTS:  None    PROCEDURES:  None    FINAL IMPRESSION    Please see note of Estephanie Munroe. DISPOSITION/PLAN   Please see note of Estephanie Munroe. PATIENT REFERRED TO:  No follow-up provider specified. DISCHARGE MEDICATIONS:  New Prescriptions    No medications on file       (Please note that portions of this note were completed with a voice recognition program.  Efforts were made to edit the dictations but occasionally words are mis-transcribed.)    The patient was given an opportunity to see the Emergency Attending. The patient voiced understanding that I was a Mid-Level Provider and was in agreement with being seen independently by myself. Provider:  I personally performed the services described in the documentation, reviewed and edited the documentation which was dictated to the scribe in my presence, and it accurately records my words and actions.     Mina Viveros PA-C 9/21/18 11:15 PM    Renetta Peace PA-C  09/21/18 [id number]

## 2018-09-22 NOTE — CONSULTS
no masses or organomegaly  Neurological - alert, oriented, normal speech, no focal findings or movement disorder noted  Musculoskeletal - + right knee joint tenderness, swelling  Extremities - peripheral pulses normal, no pedal edema, no clubbing or cyanosis  Skin - normal coloration and turgor, no rashes, no suspicious skin lesions noted      Review of Labs and Diagnostic Testing:    Recent Results (from the past 24 hour(s))   Uric acid    Collection Time: 09/21/18 11:18 PM   Result Value Ref Range    Uric Acid 11.1 (H) 3.7 - 7.0 mg/dL   CBC auto differential    Collection Time: 09/21/18 11:18 PM   Result Value Ref Range    WBC 15.4 (H) 4.8 - 10.8 thou/mm3    RBC 5.02 4.70 - 6.10 mill/mm3    Hemoglobin 15.6 14.0 - 18.0 gm/dl    Hematocrit 44.6 42.0 - 52.0 %    MCV 88.8 80.0 - 94.0 fL    MCH 31.1 26.0 - 33.0 pg    MCHC 35.0 32.2 - 35.5 gm/dl    RDW-CV 13.0 11.5 - 14.5 %    RDW-SD 42.0 35.0 - 45.0 fL    Platelets 598 830 - 955 thou/mm3    MPV 10.2 9.4 - 12.4 fL    Seg Neutrophils 83.5 %    Lymphocytes 9.4 %    Monocytes 6.3 %    Eosinophils 0.2 %    Basophils 0.2 %    Immature Granulocytes 0.4 %    Segs Absolute 12.9 (H) 1.8 - 7.7 thou/mm3    Lymphocytes # 1.4 1.0 - 4.8 thou/mm3    Monocytes # 1.0 0.4 - 1.3 thou/mm3    Eosinophils # 0.0 0.0 - 0.4 thou/mm3    Basophils # 0.0 0.0 - 0.1 thou/mm3    Immature Grans (Abs) 0.06 0.00 - 0.07 thou/mm3    nRBC 0 /100 wbc   C-reactive protein    Collection Time: 09/21/18 11:18 PM   Result Value Ref Range    CRP 11.39 (H) 0.00 - 1.00 mg/dl   Basic Metabolic Panel    Collection Time: 09/21/18 11:18 PM   Result Value Ref Range    Sodium 138 135 - 145 meq/L    Potassium 3.7 3.5 - 5.2 meq/L    Chloride 98 98 - 111 meq/L    CO2 25 23 - 33 meq/L    Glucose 232 (H) 70 - 108 mg/dL    BUN 7 7 - 22 mg/dL    CREATININE 1.2 0.4 - 1.2 mg/dL    Calcium 10.0 8.5 - 10.5 mg/dL   Anion Gap    Collection Time: 09/21/18 11:18 PM   Result Value Ref Range    Anion Gap 15.0 8.0 - 16.0 meq/L Glomerular Filtration Rate, Estimated    Collection Time: 09/21/18 11:18 PM   Result Value Ref Range    Est, Glom Filt Rate 73 (A) ml/min/1.73m2   Osmolality    Collection Time: 09/21/18 11:18 PM   Result Value Ref Range    Osmolality Calc 281.1 275.0 - 300 mOsmol/kg   Synovial fluid, cell count    Collection Time: 09/22/18 12:46 AM   Result Value Ref Range    Color, Fluid LIGHT ORANGE     CHARACTER,SYN. FL CLOUDY     Total Volume Received Synovial 7.0 ml    Total Nucleated Cells Synovial 622530 (H) 0 - 150 /cumm    Polymorphonuclear Cells Synovial Fluid 92.4 (H) 0.0 - 24.0 %    Mononuclear Cells Synovial Fluid 7.6 0.0 - 74.0 %   Protime-INR    Collection Time: 09/22/18  2:27 AM   Result Value Ref Range    INR 1.12 0.85 - 1.13   APTT    Collection Time: 09/22/18  2:27 AM   Result Value Ref Range    aPTT 37.0 22.0 - 38.0 seconds   Urine Drug Screen    Collection Time: 09/22/18  3:15 AM   Result Value Ref Range    AMPHETAMINE+METHAMPHETAMINE URINE SCREEN Negative NEGATIVE    Barbiturate Quant, Ur Negative NEGATIVE    Benzodiazepine Quant, Ur Negative NEGATIVE    Cannabinoid Quant, Ur Negative NEGATIVE    Cocaine Metab Quant, Ur Negative NEGATIVE    Opiates, Urine Negative NEGATIVE    Oxycodone Negative NEGATIVE    PCP Quant, Ur Negative NEGATIVE   Urine with Reflexed Micro    Collection Time: 09/22/18  3:15 AM   Result Value Ref Range    Glucose, Ur 250 (A) NEGATIVE mg/dl    Bilirubin Urine NEGATIVE NEGATIVE    Ketones, Urine NEGATIVE NEGATIVE    Specific Gravity, Urine 1.009 1.002 - 1.03    Blood, Urine TRACE (A) NEGATIVE    pH, UA 6.5 5.0 - 9.0    Protein, UA 30 (A) NEGATIVE    Urobilinogen, Urine 1.0 0.0 - 1.0 eu/dl    Nitrite, Urine POSITIVE (A) NEGATIVE    Leukocyte Esterase, Urine NEGATIVE NEGATIVE    Color, UA YELLOW STRAW-YELL    Character, Urine CLEAR CLEAR-SL C    RBC, UA 0-2 0-2/hpf /hpf    WBC, UA 0-2 0-4/hpf /hpf    Epi Cells NONE SEEN 3-5/hpf /hpf    Bacteria, UA MODERATE FEW/NONE S /hpf    Casts UA NONE SEEN NONE SEEN /lpf    Crystals NONE SEEN NONE SEEN    Renal Epithelial, Urine NONE SEEN NONE SEEN    Yeast, UA NONE SEEN NONE SEEN    CASTS 2 NONE SEEN NONE SEEN /lpf    MISCELLANEOUS 2 NONE SEEN    Lactic Acid, Plasma    Collection Time: 09/22/18  3:25 AM   Result Value Ref Range    Lactic Acid 1.8 0.5 - 2.2 mmol/L   Sedimentation Rate    Collection Time: 09/22/18  3:25 AM   Result Value Ref Range    Sed Rate 53 (H) 0 - 10 mm/hr   Troponin    Collection Time: 09/22/18  3:25 AM   Result Value Ref Range    Troponin T < 0.010 ng/ml   Procalcitonin    Collection Time: 09/22/18  3:25 AM   Result Value Ref Range    Procalcitonin 0.14 (H) 0.01 - 0.09 ng/mL       Radiology:     Xr Knee Right (min 4 Views)    Result Date: 9/22/2018  PROCEDURE: XR KNEE RIGHT (MIN 4 VIEWS) CLINICAL INFORMATION: right knee hot, red, painful and no known injury, concern for arthritis vs gout or infected joint, . COMPARISON: Right knee series dated 2/22/2005  TECHNIQUE: 4 views of the right knee include an AP view, a lateral view and bilateral oblique views. FINDINGS: Bones/joints: No fracture or dislocation. No joint space narrowing or erosive changes. There is a very small suprapatellar joint effusion. Soft tissues: No significant soft tissue swelling. Very small suprapatellar joint effusion. Otherwise normal right knee series. **This report has been created using voice recognition software. It may contain minor errors which are inherent in voice recognition technology. ** Final report electronically signed by Dr. Kalani Madrigal on 9/22/2018 12:08 AM        EKG: pending    Assessment / Plan:    1. Septic arthritis of knee ([facility] [address]- IV Rocephin, Vancomycin, IV hydration, blood cultures, UA, UDS, lactic acid, procal, sed rate, crp, Uric acid, CXR, troponin, EKG, Ortho following  2. Legally blind  3. Hyperglycemia- SSI, HgA1C  4.   UTI (urinary tract infection)- Rocephin, CT abdomen pelvis  5.    Hyperuricemia - Tylenol prn, outpatient treatment with FP        Dispo: Patient seen and examined              Medications, Labs reviewed, CXR pending             Sliding scale insulin coverage as needed             Patient appears stable for surgery             DVT/ PE prophylaxis per ortho                                    Thank you Dr Tho Trinh for allowing me to participate in this patient's care.       DVT prophylaxis: [] Lovenox                                 [] SCDs                                 [] SQ Heparin                                 [] Encourage ambulation, low risk for DVT, no chemical or mechanical prophylaxis necessary              [] Already on Anticoagulation                Anticipated Disposition upon discharge: [] Home                                                                         [] Home with Home Health                                                                         [] [facility]                                                                         [] [facility] [address]          Electronically signed by Laquita Gitelman, DO on 9/22/2018 at 4:38 AM

## 2018-09-23 VITALS
WEIGHT: 178 LBS | DIASTOLIC BLOOD PRESSURE: 52 MMHG | HEART RATE: 89 BPM | BODY MASS INDEX: 31.54 KG/M2 | HEIGHT: 63 IN | SYSTOLIC BLOOD PRESSURE: 134 MMHG | TEMPERATURE: 98.3 F | OXYGEN SATURATION: 95 % | RESPIRATION RATE: 16 BRPM

## 2018-09-23 LAB
BASOPHILS # BLD: 0.1 %
BASOPHILS ABSOLUTE: 0 THOU/MM3 (ref 0–0.1)
EOSINOPHIL # BLD: 0.2 %
EOSINOPHILS ABSOLUTE: 0 THOU/MM3 (ref 0–0.4)
ERYTHROCYTE [DISTWIDTH] IN BLOOD BY AUTOMATED COUNT: 13.2 % (ref 11.5–14.5)
ERYTHROCYTE [DISTWIDTH] IN BLOOD BY AUTOMATED COUNT: 43.8 FL (ref 35–45)
GLUCOSE BLD-MCNC: 129 MG/DL (ref 70–108)
GLUCOSE BLD-MCNC: 139 MG/DL (ref 70–108)
HCT VFR BLD CALC: 39.7 % (ref 42–52)
HEMOGLOBIN: 13.7 GM/DL (ref 14–18)
IMMATURE GRANS (ABS): 0.06 THOU/MM3 (ref 0–0.07)
IMMATURE GRANULOCYTES: 0.4 %
LYMPHOCYTES # BLD: 11.7 %
LYMPHOCYTES ABSOLUTE: 1.6 THOU/MM3 (ref 1–4.8)
MCH RBC QN AUTO: 31.3 PG (ref 26–33)
MCHC RBC AUTO-ENTMCNC: 34.5 GM/DL (ref 32.2–35.5)
MCV RBC AUTO: 90.6 FL (ref 80–94)
MONOCYTES # BLD: 8.8 %
MONOCYTES ABSOLUTE: 1.2 THOU/MM3 (ref 0.4–1.3)
NUCLEATED RED BLOOD CELLS: 0 /100 WBC
PLATELET # BLD: 253 THOU/MM3 (ref 130–400)
PMV BLD AUTO: 9.8 FL (ref 9.4–12.4)
RBC # BLD: 4.38 MILL/MM3 (ref 4.7–6.1)
SEG NEUTROPHILS: 78.8 %
SEGMENTED NEUTROPHILS ABSOLUTE COUNT: 10.8 THOU/MM3 (ref 1.8–7.7)
WBC # BLD: 13.7 THOU/MM3 (ref 4.8–10.8)

## 2018-09-23 PROCEDURE — 97116 GAIT TRAINING THERAPY: CPT

## 2018-09-23 PROCEDURE — 97161 PT EVAL LOW COMPLEX 20 MIN: CPT

## 2018-09-23 PROCEDURE — 6360000002 HC RX W HCPCS: Performed by: INTERNAL MEDICINE

## 2018-09-23 PROCEDURE — 36415 COLL VENOUS BLD VENIPUNCTURE: CPT

## 2018-09-23 PROCEDURE — 82948 REAGENT STRIP/BLOOD GLUCOSE: CPT

## 2018-09-23 PROCEDURE — G8979 MOBILITY GOAL STATUS: HCPCS

## 2018-09-23 PROCEDURE — 85025 COMPLETE CBC W/AUTO DIFF WBC: CPT

## 2018-09-23 PROCEDURE — G8978 MOBILITY CURRENT STATUS: HCPCS

## 2018-09-23 RX ORDER — TRAMADOL HYDROCHLORIDE 50 MG/1
50 TABLET ORAL EVERY 6 HOURS PRN
Qty: 20 TABLET | Refills: 0 | Status: SHIPPED | OUTPATIENT
Start: 2018-09-23 | End: 2018-09-30

## 2018-09-23 RX ADMIN — METHYLPREDNISOLONE 4 MG: 4 TABLET ORAL at 08:00

## 2018-09-23 ASSESSMENT — PAIN SCALES - GENERAL
PAINLEVEL_OUTOF10: 0
PAINLEVEL_OUTOF10: 3
PAINLEVEL_OUTOF10: 0
PAINLEVEL_OUTOF10: 3

## 2018-09-23 NOTE — DISCHARGE SUMMARY
Patient Name:  Santo Beebe  YOB: 1990   MRN:  [id number]   Date: 09/23/18    Discharge Summary      Admit date: 9/21/2018    Discharge date and time: 9/23/18    Admitting Physician: Valeria Dotson MD     Admission Diagnoses:  Septic arthritis of knee [facility])    Discharge Diagnoses:  Right knee gouty arthritis    Problem List: Principal Problem:    Septic arthritis of knee ([address]  Active Problems:    Legally blind    Hyperglycemia    UTI (urinary tract infection)    Hyperuricemia  Resolved Problems:    * No resolved hospital problems. *      Operative Procedures: arthroscopic irrigation and debridement of right knee    Hospital Course:  Patient was admitted in early morning of 9/22/18 for severely increased knee pain for several days and clinical and laboratory evidence of possible sepsis. He was taken to surgery due to concerns for a septic joint and during surgery gouty crystals were visualizied with no purulent material noted. Patient tolerated the arthroscopy very well and was discharged in improved condition compared to preoperative condition. Disposition: Per Home with family and to follow with Primary Care Physician for management of gout. Discharge Medications:       Medication List      START taking these medications    methylPREDNISolone 4 MG tablet  Commonly known as:  MEDROL DOSEPACK  Take by mouth. ASK your doctor about these medications    acetaminophen 325 MG tablet  Commonly known as:  TYLENOL     aspirin 81 MG tablet           Where to Get Your Medications      These medications were sent to [address] [place] [id number]    Phone:  [phone]   · methylPREDNISolone 4 MG tablet         Patient Instructions: The patient will notify me for any increased bleeding, drainage, or progressively worsening pain, or other concerning symptoms.   They have been instructed to

## 2018-09-23 NOTE — PROGRESS NOTES
3654 ARRIVED IN PACU FROM O R AFTER GENERAL ANESTHESIA. SEE FLOW SHEET  [phone] CHEMSTICK-[id number]. CALLED TO DR. CIFUENTES. SAYS OK. NO FURTHER ORDER  0830, PT. RESTS CALM AND COMFORTABLE. DENIES PAIN. REPORT CALLED TO FLOOR NURSE RAQUEL  0850 TO ROOM WITH O2 IN STABLE CONDITION. FAMILY IN [facility] ROOM GIVEN ROOM NUMBER AND GOING UP TO ROOM.
Admission completed with mother, Eliezer Vincent at the nurses station.
Discussing discharge plans with patient. Patient wants to wait on his mother to discuss AVS.     [phone] discussed discharge teaching with mom and patient, mom will make appointment for f/u with Dr Paolo Santiago, and Dr Mcgrath , and  RX at [employer] for medrol dose pack, and Ultram RX given at discharge. Mom will change dressing tomorrow and rewrap dressing and ace. Patient ready to go.     1300 patient discharged, via wheelchair with mom to discharge lobby to home.
Seen by hospitalist in the a.m. As a consult for medical management admitted with gouty arthirits, ?sepsis(poa). Patient admitted with gouty arthritis- had arthroscopic irrigation and debridement of the right knee on 09/22/2018 for orthopedic surgery  Will add Medrol Dosepak to the current regimen as well as for discharge. Avoiding NSAIDs given that he just had surgery. For preventing gouty arthritis he can follow up with PCP and be can be placed on long-term medications for the same but however they're not indicated in a gout flare.   Okay for discharge whenever primary attending physician decides to do so    Medicine will sign off  Electronically signed by Caren Stone MD on 9/22/2018 at 3:35 PM'
strength for functional mobility   Long term goals  Time Frame for Long term goals : No LTGs secondary to ELOS     Evaluation Complexity: Based on the findings of patient history, examination, clinical presentation, and decision making during this evaluation, the evaluation of Myah Mclaughlin  is of low complexity.     PT G-Codes  Functional Assessment Tool Used: AM-PAC   Functional Limitation: Mobility: Walking and moving around  Mobility: Walking and Moving Around Current Status (): 0 percent impaired, limited or restricted  Mobility: Walking and Moving Around Goal Status (): 0 percent impaired, limited or restricted    AM-PAC Inpatient Mobility Raw Score : 24  AM-PAC Inpatient T-Scale Score : 61.14  Mobility Inpatient CMS 0-100% Score: 0  Mobility Inpatient CMS G-Code Modifier : [facility] AND [facility]

## 2018-09-24 LAB
ORGANISM: ABNORMAL
URINE CULTURE REFLEX: ABNORMAL

## 2018-09-27 LAB
AEROBIC CULTURE: NORMAL
ANAEROBIC CULTURE: NORMAL
ANAEROBIC CULTURE: NORMAL
BLOOD CULTURE, ROUTINE: NORMAL
BLOOD CULTURE, ROUTINE: NORMAL
BODY FLUID CULTURE, STERILE: NORMAL
GRAM STAIN RESULT: NORMAL
GRAM STAIN RESULT: NORMAL

## 2018-10-22 PROBLEM — N39.0 UTI (URINARY TRACT INFECTION): Status: RESOLVED | Noted: 2018-09-22 | Resolved: 2018-10-22

## (undated) DEVICE — DRESSING,GAUZE,XEROFORM,CURAD,5"X9",ST: Brand: CURAD

## (undated) DEVICE — INTENDED FOR TISSUE SEPARATION, AND OTHER PROCEDURES THAT REQUIRE A SHARP SURGICAL BLADE TO PUNCTURE OR CUT.: Brand: BARD-PARKER ® CARBON RIB-BACK BLADES

## (undated) DEVICE — YANKAUER,BULB TIP,W/O VENT,RIGID,STERILE: Brand: MEDLINE

## (undated) DEVICE — LINER SUCT CANSTR 1500CC SEMI RIG W/ POR HYDROPHOBIC SHUT

## (undated) DEVICE — DRAIN SURG 10FR PVC TB W/ TRCR MID PERF NO RESVR HUBLESS

## (undated) DEVICE — CAP LUER FEM M CONN X- L

## (undated) DEVICE — PAD,ABDOMINAL,5"X9",ST,LF,25/BX: Brand: MEDLINE INDUSTRIES, INC.

## (undated) DEVICE — 3M™ WARMING BLANKET, UPPER BODY, 10 PER CASE, 42268: Brand: BAIR HUGGER™

## (undated) DEVICE — COVER ARMBRD W13XL28.5IN IMPERV BLU FOR OP RM

## (undated) DEVICE — BLADE CLIPPER GEN PURP NS

## (undated) DEVICE — PADDING,UNDERCAST,COTTON, 4"X4YD STERILE: Brand: MEDLINE

## (undated) DEVICE — GOWN,SIRUS,NON REINFRCD,LARGE,SET IN SL: Brand: MEDLINE

## (undated) DEVICE — TUBING, SUCTION, 1/4" X 20', STRAIGHT: Brand: MEDLINE INDUSTRIES, INC.

## (undated) DEVICE — SPONGE DRN W4XL4IN RAYON/POLYESTER 6 PLY NONWOVEN PRECUT

## (undated) DEVICE — KIT EVAC 400CC PVC RADPQ Y CONN

## (undated) DEVICE — GLOVE ORANGE PI 8 1/2   MSG9085

## (undated) DEVICE — SPONGE LAP W18XL18IN WHT COT 4 PLY FLD STRUNG RADPQ DISP ST

## (undated) DEVICE — GLOVE SURG SZ 65 THK91MIL LTX FREE SYN POLYISOPRENE

## (undated) DEVICE — PADDING CAST W6INXL4YD COT LO LINTING WYTEX

## (undated) DEVICE — STRIP,CLOSURE,WOUND,MEDI-STRIP,1/2X4: Brand: MEDLINE